# Patient Record
Sex: MALE | Race: WHITE | NOT HISPANIC OR LATINO
[De-identification: names, ages, dates, MRNs, and addresses within clinical notes are randomized per-mention and may not be internally consistent; named-entity substitution may affect disease eponyms.]

---

## 2019-02-22 PROBLEM — Z00.00 ENCOUNTER FOR PREVENTIVE HEALTH EXAMINATION: Status: ACTIVE | Noted: 2019-02-22

## 2019-02-24 ENCOUNTER — FORM ENCOUNTER (OUTPATIENT)
Age: 44
End: 2019-02-24

## 2019-02-25 ENCOUNTER — APPOINTMENT (OUTPATIENT)
Dept: ORTHOPEDIC SURGERY | Facility: CLINIC | Age: 44
End: 2019-02-25
Payer: MEDICAID

## 2019-02-25 ENCOUNTER — OUTPATIENT (OUTPATIENT)
Dept: OUTPATIENT SERVICES | Facility: HOSPITAL | Age: 44
LOS: 1 days | End: 2019-02-25
Payer: MEDICAID

## 2019-02-25 VITALS — HEIGHT: 67 IN | BODY MASS INDEX: 24.33 KG/M2 | WEIGHT: 155 LBS

## 2019-02-25 DIAGNOSIS — S86.811A STRAIN OF OTHER MUSCLE(S) AND TENDON(S) AT LOWER LEG LEVEL, RIGHT LEG, INITIAL ENCOUNTER: ICD-10-CM

## 2019-02-25 PROCEDURE — 99203 OFFICE O/P NEW LOW 30 MIN: CPT

## 2019-02-25 PROCEDURE — 73562 X-RAY EXAM OF KNEE 3: CPT

## 2019-02-25 PROCEDURE — 73562 X-RAY EXAM OF KNEE 3: CPT | Mod: 26,50

## 2019-03-04 NOTE — PHYSICAL EXAM
[de-identified] : General: HECTOR, A&O x3, Appropriately Dressed\par Skin: Warm and Dry, Normal Turgor, no rashes\par Neuro: AOx3, Cranial nerves grossly intact\par Psych: Mood and affect appropriate\par \par Focused Examination of the right aknee: No swelling or erythema. Extensor mechanism intact without palpable defects. tender to palpation at inferior pole of patella, proximal patellar tendon. No pain with patellar grind. Range of motion 0-135. The joint is stable to varus and valgus stress, negative lachman, negative anterior and posterior drawer. \par 4/5 quad strength on Right. 5/5 quad on Left. Neurovascularly intact distally.\par \par Ambulates with a normal gait without assistive devices.\par  [de-identified] : XR bilateral knee 3V with no degeneration, fracture or other noted abnormalities. \par \par MRI report from March 2018 and reviewed on patient's phone and shows low-grade partial intrasubstance tear of the proximal one third of the patellar tendon with a proximal cyst within the tendon.

## 2019-03-04 NOTE — ASSESSMENT
[FreeTextEntry1] : 43-year-old male with a three-year history of right knee pain and partial intrasubstance patellar tendon tear noted on MRI from one year ago\par \par -Given continued symptoms without resolution, new MRI right knee ordered\par -Patient has a appointment scheduled with pain management, I advised him to discontinue Percocets as able and with help of pain management\par -Followup after MRI for further treatment plan...

## 2019-03-04 NOTE — HISTORY OF PRESENT ILLNESS
[de-identified] : 43-year-old male presents for the first time for evaluation of right patellar tendon pain over the last 3 years. He recently moved to [place] from [place]. In [place] he had previously had an MRI done in March of 2018 which showed a low-grade partial intrasubstance tear of the proximal one third of the patellar tendon with cyst formation. He had been scheduled for surgery but surgery was canceled when the surgeon found out that he was taking Percocet for pain.\par At this point, reports pain is worse even than it was before. Now 8/10 at inferior pole of patella. Pain is constant, worse with walking and stairs. He is taking meloxicam daily as well as 1-4 Percocet 10s daily. \par Has never had injections in the knee. He did have physical therapy a while back.